# Patient Record
Sex: MALE | Race: BLACK OR AFRICAN AMERICAN | Employment: FULL TIME | URBAN - METROPOLITAN AREA
[De-identification: names, ages, dates, MRNs, and addresses within clinical notes are randomized per-mention and may not be internally consistent; named-entity substitution may affect disease eponyms.]

---

## 2017-02-19 ENCOUNTER — HOSPITAL ENCOUNTER (EMERGENCY)
Age: 27
Discharge: HOME | End: 2017-02-19
Attending: EMERGENCY MEDICINE
Payer: COMMERCIAL

## 2017-02-19 VITALS
BODY MASS INDEX: 21.66 KG/M2 | OXYGEN SATURATION: 97 % | TEMPERATURE: 99 F | HEART RATE: 80 BPM | HEIGHT: 67 IN | WEIGHT: 138 LBS | DIASTOLIC BLOOD PRESSURE: 59 MMHG | RESPIRATION RATE: 16 BRPM | SYSTOLIC BLOOD PRESSURE: 116 MMHG

## 2017-02-19 DIAGNOSIS — M54.6 ACUTE BILATERAL THORACIC BACK PAIN: Primary | ICD-10-CM

## 2017-02-19 PROCEDURE — 99282 EMERGENCY DEPT VISIT SF MDM: CPT | Performed by: PHYSICIAN ASSISTANT

## 2017-02-19 RX ORDER — METHOCARBAMOL 750 MG/1
1500 TABLET, FILM COATED ORAL 4 TIMES DAILY
Qty: 40 TAB | Refills: 0 | Status: SHIPPED | OUTPATIENT
Start: 2017-02-19

## 2017-02-19 RX ORDER — NAPROXEN 500 MG/1
500 TABLET, DELAYED RELEASE ORAL 2 TIMES DAILY WITH MEALS
Qty: 20 TAB | Refills: 0 | Status: SHIPPED | OUTPATIENT
Start: 2017-02-19

## 2017-02-19 NOTE — ED PROVIDER NOTES
HPI Comments: Pt states he stands at [employer]-, pain to mid back for 2-3 weeks, changes shoes, helped some, taking tylenol w/o relief, no injury, no numbness or tingling into legs, no bowel or bladder symptoms     Patient is a 27 y.o. male presenting with back pain. The history is provided by the patient. Back Pain    This is a new problem. Episode onset: 2-3 weeks. The problem has not changed since onset. The problem occurs constantly. Patient reports not work related injury. The pain is associated with no known injury. The pain is present in the thoracic spine. The quality of the pain is described as aching. The pain does not radiate. The pain is at a severity of 5/10. The pain is mild. The symptoms are aggravated by certain positions. Pertinent negatives include no abdominal pain, no leg pain and no weakness. Treatments tried: tylenol  The treatment provided no relief. History reviewed. No pertinent past medical history. History reviewed. No pertinent past surgical history. History reviewed. No pertinent family history. Social History     Social History    Marital status: SINGLE     Spouse name: N/A    Number of children: N/A    Years of education: N/A     Occupational History    Not on file. Social History Main Topics    Smoking status: Current Some Day Smoker    Smokeless tobacco: Not on file    Alcohol use No    Drug use: Yes     Special: Marijuana    Sexual activity: Yes     Birth control/ protection: None     Other Topics Concern    Not on file     Social History Narrative         ALLERGIES: Review of patient's allergies indicates no known allergies. Review of Systems   Gastrointestinal: Negative for abdominal pain. Musculoskeletal: Positive for back pain. Neurological: Negative for weakness. All other systems reviewed and are negative.       Vitals:    02/19/17 1128   BP: 116/59   Pulse: 80   Resp: 16   Temp: 99 °F (37.2 °C)   SpO2: 97%   Weight: 62.6 kg (138 lb)   Height: 5' 7\" (1.702 m)            Physical Exam   Constitutional: He is oriented to person, place, and time. He appears well-developed and well-nourished. No distress. HENT:   Head: Normocephalic and atraumatic. Eyes: Conjunctivae and EOM are normal. Pupils are equal, round, and reactive to light. Neck: Normal range of motion. Neck supple. Cardiovascular: Normal rate and regular rhythm. Pulmonary/Chest: Effort normal and breath sounds normal. No respiratory distress. He has no wheezes. Abdominal: Soft. Bowel sounds are normal.   Musculoskeletal: He exhibits tenderness. He exhibits no edema. Back:    soreness to mid thoracic spine area, worse with motion, no pain to lower back or  hips    Neurological: He is alert and oriented to person, place, and time. Skin: Skin is warm. Nursing note and vitals reviewed.        MDM  Number of Diagnoses or Management Options  Diagnosis management comments: Mid back pain, use naprosyn twice a day, work note given        Amount and/or Complexity of Data Reviewed  Review and summarize past medical records: yes    Risk of Complications, Morbidity, and/or Mortality  Presenting problems: low  Diagnostic procedures: low  Management options: low    Patient Progress  Patient progress: improved    ED Course       Procedures

## 2017-02-19 NOTE — ED TRIAGE NOTES
Reports had a stomach bug yesterday but feels better today. States is having mid back pain x 2-3 weeks.

## 2017-02-19 NOTE — LETTER
[id number] [facility] [place] [facility] [address] [id number] [phone] Work/School Note Date: 2/19/2017 To Whom It May concern: 
 
Parth Boss was seen and treated today in the emergency room by the following provider(s): 
Attending Provider: Antoinette Kim MD 
Physician Assistant: NADINE Lima. Glynn Nelson may return to work on 2-20-17. Sincerely, NADINE Lima

## 2017-02-19 NOTE — Clinical Note
Use meds as directed, apply moist heat or use heat patches to area daily, call office to arrange follow up appt